# Patient Record
Sex: FEMALE | Race: BLACK OR AFRICAN AMERICAN | NOT HISPANIC OR LATINO | URBAN - METROPOLITAN AREA
[De-identification: names, ages, dates, MRNs, and addresses within clinical notes are randomized per-mention and may not be internally consistent; named-entity substitution may affect disease eponyms.]

---

## 2021-05-14 ENCOUNTER — OFFICE VISIT (OUTPATIENT)
Dept: URBAN - METROPOLITAN AREA CLINIC 27 | Facility: CLINIC | Age: 51
End: 2021-05-14

## 2021-05-14 PROBLEM — 55822004 HYPERLIPIDEMIA: Status: ACTIVE | Noted: 2021-05-14

## 2021-05-14 PROBLEM — 59621000 ESSENTIAL HYPERTENSION: Status: ACTIVE | Noted: 2021-05-14

## 2021-05-14 PROBLEM — 414916001 OBESITY: Status: ACTIVE | Noted: 2021-05-14

## 2021-06-21 ENCOUNTER — OFFICE VISIT (OUTPATIENT)
Dept: URBAN - METROPOLITAN AREA SURGERY CENTER 7 | Facility: SURGERY CENTER | Age: 51
End: 2021-06-21

## 2021-06-25 ENCOUNTER — OFFICE VISIT (OUTPATIENT)
Dept: URBAN - METROPOLITAN AREA CLINIC 27 | Facility: CLINIC | Age: 51
End: 2021-06-25

## 2022-04-30 ENCOUNTER — TELEPHONE ENCOUNTER (OUTPATIENT)
Dept: URBAN - METROPOLITAN AREA CLINIC 121 | Facility: CLINIC | Age: 52
End: 2022-04-30

## 2022-05-01 ENCOUNTER — TELEPHONE ENCOUNTER (OUTPATIENT)
Dept: URBAN - METROPOLITAN AREA CLINIC 121 | Facility: CLINIC | Age: 52
End: 2022-05-01

## 2022-05-01 RX ORDER — PANTOPRAZOLE SODIUM 40 MG/1
TABLET, DELAYED RELEASE ORAL
Status: ACTIVE | COMMUNITY
Start: 2021-05-14

## 2022-05-01 RX ORDER — LISINOPRIL 10 MG/1
TABLET ORAL
Status: ACTIVE | COMMUNITY
Start: 2021-05-14

## 2022-05-01 RX ORDER — METFORMIN HCL 500 MG/1
TABLET ORAL
Status: ACTIVE | COMMUNITY
Start: 2021-05-14

## 2022-05-01 RX ORDER — POLYETHYLENE GLYCOL 3350, SODIUM SULFATE, SODIUM CHLORIDE, POTASSIUM CHLORIDE, ASCORBIC ACID, SODIUM ASCORBATE 140-9-5.2G
MIX AND USE DIRECTED.   USE CO-PAY CARD BELOW:  BIN 019158  PCN: CNRX GROUP: AC68037003 ID: 39275793763 KIT ORAL
Status: ACTIVE | COMMUNITY
Start: 2021-05-14

## 2022-11-28 ENCOUNTER — LAB OUTSIDE AN ENCOUNTER (OUTPATIENT)
Dept: URBAN - METROPOLITAN AREA CLINIC 27 | Facility: CLINIC | Age: 52
End: 2022-11-28

## 2022-11-28 ENCOUNTER — WEB ENCOUNTER (OUTPATIENT)
Dept: URBAN - METROPOLITAN AREA CLINIC 27 | Facility: CLINIC | Age: 52
End: 2022-11-28

## 2022-11-28 ENCOUNTER — OFFICE VISIT (OUTPATIENT)
Dept: URBAN - METROPOLITAN AREA CLINIC 27 | Facility: CLINIC | Age: 52
End: 2022-11-28
Payer: COMMERCIAL

## 2022-11-28 VITALS
RESPIRATION RATE: 17 BRPM | HEART RATE: 98 BPM | BODY MASS INDEX: 35.88 KG/M2 | DIASTOLIC BLOOD PRESSURE: 79 MMHG | HEIGHT: 62 IN | SYSTOLIC BLOOD PRESSURE: 134 MMHG | WEIGHT: 195 LBS

## 2022-11-28 DIAGNOSIS — I10 PRIMARY HYPERTENSION: ICD-10-CM

## 2022-11-28 DIAGNOSIS — K21.9 GASTROESOPHAGEAL REFLUX DISEASE WITHOUT ESOPHAGITIS: ICD-10-CM

## 2022-11-28 DIAGNOSIS — Z12.11 COLON CANCER SCREENING: ICD-10-CM

## 2022-11-28 DIAGNOSIS — E11.9 TYPE 2 DIABETES MELLITUS WITHOUT COMPLICATIONS: ICD-10-CM

## 2022-11-28 DIAGNOSIS — Z79.4 LONG TERM (CURRENT) USE OF INSULIN: ICD-10-CM

## 2022-11-28 DIAGNOSIS — R11.0 NAUSEA: ICD-10-CM

## 2022-11-28 PROBLEM — 275978004 SCREENING FOR MALIGNANT NEOPLASM OF COLON: Status: ACTIVE | Noted: 2021-05-14

## 2022-11-28 PROCEDURE — 99204 OFFICE O/P NEW MOD 45 MIN: CPT | Performed by: INTERNAL MEDICINE

## 2022-11-28 RX ORDER — PANTOPRAZOLE SODIUM 40 MG/1
1 TABLET TABLET, DELAYED RELEASE ORAL TWICE A DAY
Qty: 28 TABLET | Refills: 0 | OUTPATIENT
Start: 2022-11-29

## 2022-11-28 RX ORDER — POLYETHYLENE GLYCOL 3350, SODIUM SULFATE, SODIUM CHLORIDE, POTASSIUM CHLORIDE, ASCORBIC ACID, SODIUM ASCORBATE 140-9-5.2G
MIX AND USE DIRECTED.   USE CO-PAY CARD BELOW:  BIN 019158  PCN: CNRX GROUP: AC68037003 ID: 39275793763 KIT ORAL
Status: ACTIVE | COMMUNITY
Start: 2021-05-14

## 2022-11-28 RX ORDER — METFORMIN HCL 500 MG/1
TABLET ORAL
Status: ACTIVE | COMMUNITY
Start: 2021-05-14

## 2022-11-28 RX ORDER — PANTOPRAZOLE SODIUM 40 MG/1
TABLET, DELAYED RELEASE ORAL
Status: ACTIVE | COMMUNITY
Start: 2021-05-14

## 2022-11-28 RX ORDER — POLYETHYLENE GLYCOL 3350, SODIUM SULFATE, SODIUM CHLORIDE, POTASSIUM CHLORIDE, ASCORBIC ACID, SODIUM ASCORBATE 140-9-5.2G
AS DIRECTED KIT ORAL ONCE
Qty: 1 BOX | Refills: 0 | OUTPATIENT
Start: 2022-11-29 | End: 2022-11-30

## 2022-11-28 RX ORDER — LISINOPRIL 10 MG/1
TABLET ORAL
Status: ACTIVE | COMMUNITY
Start: 2021-05-14

## 2022-11-28 NOTE — HPI-TODAY'S VISIT:
Mrs. Lincoln is a 49-year-old [place] female who presents today per request by Dr. Marina for evaluation of pyrosis, regurgitation, waterbrash and epigastric discomfort for the past couple years.  She is currently taking Pantoprazole periodically.  She often takes Tums for relief of her symptoms.  She reports nausea without emesis.  Otherwise, she has no other GI complaints.  She has not had a screening colonoscopy.  She denies any family history of colon cancer or polyps.  The remainder of her medical history is significant for hypertension, diabetes mellitus and hyperlipidemia.

## 2022-11-29 ENCOUNTER — LAB OUTSIDE AN ENCOUNTER (OUTPATIENT)
Dept: URBAN - METROPOLITAN AREA CLINIC 27 | Facility: CLINIC | Age: 52
End: 2022-11-29

## 2022-11-29 PROBLEM — 266435005: Status: ACTIVE | Noted: 2022-11-29

## 2022-11-29 PROBLEM — 59621000: Status: ACTIVE | Noted: 2022-11-29

## 2022-11-29 PROBLEM — 710815001: Status: ACTIVE | Noted: 2022-11-29

## 2022-11-29 PROBLEM — 313436004 TYPE II DIABETES MELLITUS WITHOUT COMPLICATION: Status: ACTIVE | Noted: 2021-05-14

## 2022-11-29 LAB
H PYLORI BREATH TEST: DETECTED
H. PYLORI BREATH TEST: DETECTED
INTERPRETATION: DETECTED

## 2022-11-30 ENCOUNTER — TELEPHONE ENCOUNTER (OUTPATIENT)
Dept: URBAN - METROPOLITAN AREA CLINIC 27 | Facility: CLINIC | Age: 52
End: 2022-11-30

## 2022-11-30 RX ORDER — METFORMIN HCL 500 MG/1
TABLET ORAL
Status: ACTIVE | COMMUNITY
Start: 2021-05-14

## 2022-11-30 RX ORDER — PANTOPRAZOLE SODIUM 40 MG/1
TABLET, DELAYED RELEASE ORAL
Status: ACTIVE | COMMUNITY
Start: 2021-05-14

## 2022-11-30 RX ORDER — PANTOPRAZOLE SODIUM 40 MG/1
1 TABLET TABLET, DELAYED RELEASE ORAL TWICE A DAY
Qty: 28 TABLET | Refills: 0 | Status: ACTIVE | COMMUNITY
Start: 2022-11-29

## 2022-11-30 RX ORDER — POLYETHYLENE GLYCOL 3350, SODIUM SULFATE, SODIUM CHLORIDE, POTASSIUM CHLORIDE, ASCORBIC ACID, SODIUM ASCORBATE 140-9-5.2G
MIX AND USE DIRECTED.   USE CO-PAY CARD BELOW:  BIN 019158  PCN: CNRX GROUP: AC68037003 ID: 39275793763 KIT ORAL
Status: ACTIVE | COMMUNITY
Start: 2021-05-14

## 2022-11-30 RX ORDER — LISINOPRIL 10 MG/1
TABLET ORAL
Status: ACTIVE | COMMUNITY
Start: 2021-05-14

## 2022-11-30 RX ORDER — POLYETHYLENE GLYCOL 3350, SODIUM SULFATE, SODIUM CHLORIDE, POTASSIUM CHLORIDE, ASCORBIC ACID, SODIUM ASCORBATE 140-9-5.2G
AS DIRECTED KIT ORAL ONCE
Qty: 1 BOX | Refills: 0 | Status: ACTIVE | COMMUNITY
Start: 2022-11-29 | End: 2022-11-30

## 2022-11-30 RX ORDER — OMEPRAZOLE MAGNESIUM, AMOXICILLIN AND RIFABUTIN 10; 250; 12.5 MG/1; MG/1; MG/1
4 CAPSULES CAPSULE, DELAYED RELEASE ORAL
Qty: 168 | OUTPATIENT
Start: 2022-11-30 | End: 2022-12-14

## 2022-12-02 ENCOUNTER — TELEPHONE ENCOUNTER (OUTPATIENT)
Dept: URBAN - METROPOLITAN AREA CLINIC 27 | Facility: CLINIC | Age: 52
End: 2022-12-02

## 2022-12-06 ENCOUNTER — CLAIMS CREATED FROM THE CLAIM WINDOW (OUTPATIENT)
Dept: URBAN - METROPOLITAN AREA SURGERY CENTER 7 | Facility: SURGERY CENTER | Age: 52
End: 2022-12-06
Payer: COMMERCIAL

## 2022-12-06 DIAGNOSIS — R11.0 AM NAUSEA: ICD-10-CM

## 2022-12-06 DIAGNOSIS — K29.60 ADENOPAPILLOMATOSIS GASTRICA: ICD-10-CM

## 2022-12-06 PROCEDURE — 43239 EGD BIOPSY SINGLE/MULTIPLE: CPT | Performed by: INTERNAL MEDICINE

## 2022-12-06 PROCEDURE — G8907 PT DOC NO EVENTS ON DISCHARG: HCPCS | Performed by: INTERNAL MEDICINE

## 2022-12-06 RX ORDER — POLYETHYLENE GLYCOL 3350, SODIUM SULFATE, SODIUM CHLORIDE, POTASSIUM CHLORIDE, ASCORBIC ACID, SODIUM ASCORBATE 140-9-5.2G
MIX AND USE DIRECTED.   USE CO-PAY CARD BELOW:  BIN 019158  PCN: CNRX GROUP: AC68037003 ID: 39275793763 KIT ORAL
Status: ACTIVE | COMMUNITY
Start: 2021-05-14

## 2022-12-06 RX ORDER — METFORMIN HCL 500 MG/1
TABLET ORAL
Status: ACTIVE | COMMUNITY
Start: 2021-05-14

## 2022-12-06 RX ORDER — PANTOPRAZOLE SODIUM 40 MG/1
TABLET, DELAYED RELEASE ORAL
Status: ACTIVE | COMMUNITY
Start: 2021-05-14

## 2022-12-06 RX ORDER — OMEPRAZOLE MAGNESIUM, AMOXICILLIN AND RIFABUTIN 10; 250; 12.5 MG/1; MG/1; MG/1
4 CAPSULES CAPSULE, DELAYED RELEASE ORAL
Qty: 168 | Status: ACTIVE | COMMUNITY
Start: 2022-11-30 | End: 2022-12-14

## 2022-12-06 RX ORDER — PANTOPRAZOLE SODIUM 40 MG/1
1 TABLET TABLET, DELAYED RELEASE ORAL TWICE A DAY
Qty: 28 TABLET | Refills: 0 | Status: ACTIVE | COMMUNITY
Start: 2022-11-29

## 2022-12-06 RX ORDER — LISINOPRIL 10 MG/1
TABLET ORAL
Status: ACTIVE | COMMUNITY
Start: 2021-05-14

## 2022-12-13 ENCOUNTER — OFFICE VISIT (OUTPATIENT)
Dept: URBAN - METROPOLITAN AREA SURGERY CENTER 7 | Facility: SURGERY CENTER | Age: 52
End: 2022-12-13
Payer: COMMERCIAL

## 2022-12-13 DIAGNOSIS — Z12.11 COLON CANCER SCREENING: ICD-10-CM

## 2022-12-13 PROCEDURE — G8907 PT DOC NO EVENTS ON DISCHARG: HCPCS | Performed by: INTERNAL MEDICINE

## 2022-12-13 PROCEDURE — 45378 DIAGNOSTIC COLONOSCOPY: CPT | Performed by: INTERNAL MEDICINE

## 2022-12-13 RX ORDER — LISINOPRIL 10 MG/1
TABLET ORAL
Status: ACTIVE | COMMUNITY
Start: 2021-05-14

## 2022-12-13 RX ORDER — PANTOPRAZOLE SODIUM 40 MG/1
TABLET, DELAYED RELEASE ORAL
Status: ACTIVE | COMMUNITY
Start: 2021-05-14

## 2022-12-13 RX ORDER — POLYETHYLENE GLYCOL 3350, SODIUM SULFATE, SODIUM CHLORIDE, POTASSIUM CHLORIDE, ASCORBIC ACID, SODIUM ASCORBATE 140-9-5.2G
MIX AND USE DIRECTED.   USE CO-PAY CARD BELOW:  BIN 019158  PCN: CNRX GROUP: AC68037003 ID: 39275793763 KIT ORAL
Status: ACTIVE | COMMUNITY
Start: 2021-05-14

## 2022-12-13 RX ORDER — OMEPRAZOLE MAGNESIUM, AMOXICILLIN AND RIFABUTIN 10; 250; 12.5 MG/1; MG/1; MG/1
4 CAPSULES CAPSULE, DELAYED RELEASE ORAL
Qty: 168 | Status: ACTIVE | COMMUNITY
Start: 2022-11-30 | End: 2022-12-14

## 2022-12-13 RX ORDER — PANTOPRAZOLE SODIUM 40 MG/1
1 TABLET TABLET, DELAYED RELEASE ORAL TWICE A DAY
Qty: 28 TABLET | Refills: 0 | Status: ACTIVE | COMMUNITY
Start: 2022-11-29

## 2022-12-13 RX ORDER — METFORMIN HCL 500 MG/1
TABLET ORAL
Status: ACTIVE | COMMUNITY
Start: 2021-05-14

## 2023-07-31 ENCOUNTER — TELEPHONE ENCOUNTER (OUTPATIENT)
Dept: URBAN - METROPOLITAN AREA CLINIC 27 | Facility: CLINIC | Age: 53
End: 2023-07-31

## 2023-08-07 ENCOUNTER — OFFICE VISIT (OUTPATIENT)
Dept: URBAN - METROPOLITAN AREA CLINIC 27 | Facility: CLINIC | Age: 53
End: 2023-08-07

## 2023-10-06 ENCOUNTER — OFFICE VISIT (OUTPATIENT)
Dept: URBAN - METROPOLITAN AREA CLINIC 27 | Facility: CLINIC | Age: 53
End: 2023-10-06
Payer: COMMERCIAL

## 2023-10-06 VITALS
BODY MASS INDEX: 36.25 KG/M2 | DIASTOLIC BLOOD PRESSURE: 74 MMHG | WEIGHT: 197 LBS | HEART RATE: 82 BPM | SYSTOLIC BLOOD PRESSURE: 116 MMHG | HEIGHT: 62 IN

## 2023-10-06 DIAGNOSIS — A04.8 BACTERIAL INFECTION DUE TO H. PYLORI: ICD-10-CM

## 2023-10-06 DIAGNOSIS — K21.9 GASTRO-ESOPHAGEAL REFLUX DISEASE WITHOUT ESOPHAGITIS: ICD-10-CM

## 2023-10-06 PROCEDURE — 91065 BREATH HYDROGEN/METHANE TEST: CPT | Performed by: INTERNAL MEDICINE

## 2023-10-06 PROCEDURE — 99214 OFFICE O/P EST MOD 30 MIN: CPT | Performed by: INTERNAL MEDICINE

## 2023-10-06 RX ORDER — PANTOPRAZOLE SODIUM 40 MG/1
TABLET, DELAYED RELEASE ORAL
Status: ACTIVE | COMMUNITY
Start: 2021-05-14

## 2023-10-06 RX ORDER — LISINOPRIL 10 MG/1
TABLET ORAL
Status: ACTIVE | COMMUNITY
Start: 2021-05-14

## 2023-10-06 RX ORDER — PANTOPRAZOLE SODIUM 40 MG/1
1 TABLET TABLET, DELAYED RELEASE ORAL TWICE A DAY
Qty: 28 TABLET | Refills: 0 | Status: ACTIVE | COMMUNITY
Start: 2022-11-29

## 2023-10-06 RX ORDER — FAMOTIDINE 20 MG/1
1 TABLET TABLET, FILM COATED ORAL TWICE DAILY
Qty: 60 CAPSULE | Refills: 3 | OUTPATIENT
Start: 2023-10-08

## 2023-10-06 RX ORDER — POLYETHYLENE GLYCOL 3350, SODIUM SULFATE, SODIUM CHLORIDE, POTASSIUM CHLORIDE, ASCORBIC ACID, SODIUM ASCORBATE 140-9-5.2G
MIX AND USE DIRECTED.   USE CO-PAY CARD BELOW:  BIN 019158  PCN: CNRX GROUP: AC68037003 ID: 39275793763 KIT ORAL
Status: ACTIVE | COMMUNITY
Start: 2021-05-14

## 2023-10-06 RX ORDER — METFORMIN HCL 500 MG/1
TABLET ORAL
Status: ACTIVE | COMMUNITY
Start: 2021-05-14

## 2023-10-06 NOTE — HPI-TODAY'S VISIT:
Mrs. Lincoln presents today for follow-up of her heartburn.  She reports that her insurance company would not cover Pantoprazole and she has to be switched to some other antiacid.  She also has a history of H. pylori infection and completed therapy.  However, she did not have a test to confirm eradication.  Otherwise she has no other GI complaints.

## 2023-10-13 LAB — H PYLORI BREATH TEST: NOT DETECTED

## 2023-11-13 ENCOUNTER — OFFICE VISIT (OUTPATIENT)
Dept: URBAN - METROPOLITAN AREA CLINIC 27 | Facility: CLINIC | Age: 53
End: 2023-11-13
Payer: COMMERCIAL

## 2023-11-13 VITALS
BODY MASS INDEX: 36.44 KG/M2 | WEIGHT: 198 LBS | HEART RATE: 82 BPM | HEIGHT: 62 IN | DIASTOLIC BLOOD PRESSURE: 73 MMHG | SYSTOLIC BLOOD PRESSURE: 119 MMHG

## 2023-11-13 DIAGNOSIS — K21.9 GASTRO-ESOPHAGEAL REFLUX DISEASE WITHOUT ESOPHAGITIS: ICD-10-CM

## 2023-11-13 DIAGNOSIS — A04.8 BACTERIAL INFECTION DUE TO H. PYLORI: ICD-10-CM

## 2023-11-13 PROBLEM — 721730009: Status: ACTIVE | Noted: 2023-10-08

## 2023-11-13 PROCEDURE — 99214 OFFICE O/P EST MOD 30 MIN: CPT | Performed by: INTERNAL MEDICINE

## 2023-11-13 RX ORDER — PANTOPRAZOLE SODIUM 40 MG/1
TABLET, DELAYED RELEASE ORAL
Status: ACTIVE | COMMUNITY
Start: 2021-05-14

## 2023-11-13 RX ORDER — FAMOTIDINE 20 MG/1
1 TABLET TABLET, FILM COATED ORAL TWICE DAILY
Qty: 60 CAPSULE | Refills: 3 | Status: ACTIVE | COMMUNITY
Start: 2023-10-08

## 2023-11-13 RX ORDER — PANTOPRAZOLE SODIUM 40 MG/1
1 TABLET TABLET, DELAYED RELEASE ORAL TWICE A DAY
Qty: 28 TABLET | Refills: 0 | Status: ACTIVE | COMMUNITY
Start: 2022-11-29

## 2023-11-13 RX ORDER — POLYETHYLENE GLYCOL 3350, SODIUM SULFATE, SODIUM CHLORIDE, POTASSIUM CHLORIDE, ASCORBIC ACID, SODIUM ASCORBATE 140-9-5.2G
MIX AND USE DIRECTED.   USE CO-PAY CARD BELOW:  BIN 019158  PCN: CNRX GROUP: AC68037003 ID: 39275793763 KIT ORAL
Status: ACTIVE | COMMUNITY
Start: 2021-05-14

## 2023-11-13 RX ORDER — METFORMIN HCL 500 MG/1
TABLET ORAL
Status: ACTIVE | COMMUNITY
Start: 2021-05-14

## 2023-11-13 RX ORDER — PANTOPRAZOLE SODIUM 40 MG/1
1 TABLET TABLET, DELAYED RELEASE ORAL TWICE A DAY
Qty: 60 TABLET | Refills: 3 | OUTPATIENT
Start: 2023-11-13

## 2023-11-13 RX ORDER — LISINOPRIL 10 MG/1
TABLET ORAL
Status: ACTIVE | COMMUNITY
Start: 2021-05-14

## 2023-11-13 NOTE — HPI-TODAY'S VISIT:
Mrs. Lincoln presents today for follow-up of her acid reflux.  During her last visit, I had to change her acid reflux medicine to Famotidine because her insurance company would not cover pantoprazole for unclear reasons.  She reports significant regurgitation and will pyrosis with famotidine.  She also had a urea breath test on her last visit which was negative.  Otherwise, she has no other GI complaints.

## 2023-12-11 ENCOUNTER — OFFICE VISIT (OUTPATIENT)
Dept: URBAN - METROPOLITAN AREA CLINIC 27 | Facility: CLINIC | Age: 53
End: 2023-12-11
Payer: COMMERCIAL

## 2023-12-11 ENCOUNTER — DASHBOARD ENCOUNTERS (OUTPATIENT)
Age: 53
End: 2023-12-11

## 2023-12-11 VITALS
SYSTOLIC BLOOD PRESSURE: 136 MMHG | RESPIRATION RATE: 17 BRPM | BODY MASS INDEX: 36.8 KG/M2 | HEIGHT: 62 IN | WEIGHT: 200 LBS | HEART RATE: 82 BPM | DIASTOLIC BLOOD PRESSURE: 75 MMHG

## 2023-12-11 DIAGNOSIS — K21.9 GASTRO-ESOPHAGEAL REFLUX DISEASE WITHOUT ESOPHAGITIS: ICD-10-CM

## 2023-12-11 PROBLEM — 266435005 GASTRO-ESOPHAGEAL REFLUX DISEASE WITHOUT ESOPHAGITIS: Status: ACTIVE | Noted: 2021-05-14

## 2023-12-11 PROCEDURE — 99213 OFFICE O/P EST LOW 20 MIN: CPT | Performed by: INTERNAL MEDICINE

## 2023-12-11 RX ORDER — FAMOTIDINE 20 MG/1
1 TABLET TABLET, FILM COATED ORAL TWICE DAILY
Qty: 60 CAPSULE | Refills: 3 | Status: ACTIVE | COMMUNITY
Start: 2023-10-08

## 2023-12-11 RX ORDER — PANTOPRAZOLE SODIUM 40 MG/1
1 TABLET TABLET, DELAYED RELEASE ORAL TWICE A DAY
Qty: 28 TABLET | Refills: 0 | Status: ACTIVE | COMMUNITY
Start: 2022-11-29

## 2023-12-11 RX ORDER — PANTOPRAZOLE SODIUM 40 MG/1
TABLET, DELAYED RELEASE ORAL
Status: ACTIVE | COMMUNITY
Start: 2021-05-14

## 2023-12-11 RX ORDER — PANTOPRAZOLE SODIUM 40 MG/1
1 TABLET TABLET, DELAYED RELEASE ORAL TWICE A DAY
Qty: 60 TABLET | Refills: 3 | Status: ACTIVE | COMMUNITY
Start: 2023-11-13

## 2023-12-11 RX ORDER — POLYETHYLENE GLYCOL 3350, SODIUM SULFATE, SODIUM CHLORIDE, POTASSIUM CHLORIDE, ASCORBIC ACID, SODIUM ASCORBATE 140-9-5.2G
MIX AND USE DIRECTED.   USE CO-PAY CARD BELOW:  BIN 019158  PCN: CNRX GROUP: AC68037003 ID: 39275793763 KIT ORAL
Status: ACTIVE | COMMUNITY
Start: 2021-05-14

## 2023-12-11 RX ORDER — LISINOPRIL 10 MG/1
TABLET ORAL
Status: ACTIVE | COMMUNITY
Start: 2021-05-14

## 2023-12-11 RX ORDER — METFORMIN HCL 500 MG/1
TABLET ORAL
Status: ACTIVE | COMMUNITY
Start: 2021-05-14

## 2023-12-11 NOTE — HPI-TODAY'S VISIT:
Mrs. Lincoln presents today for follow-up of her heartburn.  She reports that she is still having issues with her insurance company covering Pantoprazole.  However, she reports some improvement of her symptoms since she has changed her dietary indiscretions.  She takes Famotidine as needed.  Her  reports that they will have a new insurance company in January and will call to have Pantoprazole called in at that time.  Otherwise she has no other GI complaints.

## 2024-01-12 ENCOUNTER — TELEPHONE ENCOUNTER (OUTPATIENT)
Dept: URBAN - METROPOLITAN AREA CLINIC 27 | Facility: CLINIC | Age: 54
End: 2024-01-12

## 2024-01-12 RX ORDER — PANTOPRAZOLE SODIUM 40 MG/1
1 TABLET TABLET, DELAYED RELEASE ORAL TWICE A DAY
Qty: 60 TABLET | Refills: 3
Start: 2022-11-29

## 2024-01-22 ENCOUNTER — ERX REFILL RESPONSE (OUTPATIENT)
Dept: URBAN - METROPOLITAN AREA CLINIC 27 | Facility: CLINIC | Age: 54
End: 2024-01-22

## 2024-01-22 RX ORDER — FAMOTIDINE 20 MG/1
TAKE 1 TABLET BY MOUTH TWICE A DAY FOR 30 DAYS TABLET, FILM COATED ORAL
Qty: 60 TABLET | Refills: 4 | OUTPATIENT

## 2024-01-22 RX ORDER — FAMOTIDINE 20 MG/1
TAKE 1 TABLET BY MOUTH TWICE A DAY FOR 30 DAYS TABLET, FILM COATED ORAL
Qty: 60 TABLET | Refills: 3 | OUTPATIENT

## 2025-04-28 ENCOUNTER — ERX REFILL RESPONSE (OUTPATIENT)
Dept: URBAN - METROPOLITAN AREA CLINIC 27 | Facility: CLINIC | Age: 55
End: 2025-04-28

## 2025-04-28 RX ORDER — FAMOTIDINE 20 MG/1
TAKE ONE TABLET BY MOUTH TWICE A DAY TABLET, FILM COATED ORAL
Qty: 540 GRAM | Refills: 0 | OUTPATIENT